# Patient Record
Sex: FEMALE
[De-identification: names, ages, dates, MRNs, and addresses within clinical notes are randomized per-mention and may not be internally consistent; named-entity substitution may affect disease eponyms.]

---

## 2018-04-09 ENCOUNTER — HOSPITAL ENCOUNTER (EMERGENCY)
Dept: HOSPITAL 31 - C.ER | Age: 73
Discharge: HOME | End: 2018-04-09
Payer: MEDICARE

## 2018-04-09 VITALS — OXYGEN SATURATION: 99 %

## 2018-04-09 VITALS — HEART RATE: 59 BPM | SYSTOLIC BLOOD PRESSURE: 117 MMHG | TEMPERATURE: 97.8 F | DIASTOLIC BLOOD PRESSURE: 76 MMHG

## 2018-04-09 VITALS — RESPIRATION RATE: 18 BRPM

## 2018-04-09 DIAGNOSIS — M25.512: Primary | ICD-10-CM

## 2018-04-09 NOTE — RAD
PROCEDURE:  Radiographs of the Left Shoulder



HISTORY:

Joint pain 







COMPARISON:

No prior.



FINDINGS:



BONES:

Bone alignment and mineralization are normal. No acute fracture.



JOINTS:

There is mild degenerative osteoarthrosis in the acromioclavicular 

joint with subarticular irregularity and mild reduced joint space.  

The glenohumeral joint is normal. 



SOFT TISSUES:

Normal.



OTHER FINDINGS:

None. 



IMPRESSION:

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



No acute fracture or dislocation.

## 2018-04-09 NOTE — C.PDOC
History Of Present Illness


72 year old female with PMHx of HTN and DM presents to the ED for evaluation of 

B/L shoulder pain, left more then right. Patient reports she has been feeling 

pain to her left shoulder since October but in the past months it has worsened. 

Patient reports she is unable to move her arm due to pain and that pain worsens 

with movement. Patient reports she has been taking Advil for pain relief. 

Patient's PMD is Dr. Espino, she reports she had an appointment today but 

because of her pain she decided to come to the ED. Patient denies headache, 

neck pain, neck stiffness, trauma, injury, fall, weakness, numbness. 


Time Seen by Provider: 18 11:15


Chief Complaint (Nursing): Upper Extremity Problem/Injury


History Per: Patient


History/Exam Limitations: no limitations


Onset/Duration Of Symptoms: Days


Current Symptoms Are (Timing): Still Present


Quality: "Pain"


Exacerbating Factor(s): Movement


Recent travel outside of the [place]: No


Additional History Per: Patient





Past Medical History


Reviewed: Historical Data, Nursing Documentation, Vital Signs


Vital Signs: 


 Last Vital Signs











Temp  97.8 F   18 12:22


 


Pulse  59 L  18 12:22


 


Resp  18   18 12:22


 


BP  117/76   18 12:22


 


Pulse Ox  98   18 12:22














- Medical History


PMH: Diabetes, HTN


Surgical History: No Surg Hx





- [facility] Procedures








CLOSURE SKIN & SUBCUTANEOUS NEC (05)








Family History: States: Unknown Family Hx





- Social History


Hx Alcohol Use: No


Hx Substance Use: No





- Immunization History


Hx Tetanus Toxoid Vaccination: No


Hx Influenza Vaccination: No


Hx Pneumococcal Vaccination: No





Review Of Systems


Constitutional: Negative for: Fever, Chills


Cardiovascular: Negative for: Chest Pain


Respiratory: Negative for: Shortness of Breath


Musculoskeletal: Positive for: Shoulder Pain, Arm Pain


Skin: Negative for: Rash


Neurological: Negative for: Weakness, Numbness, Headache, Dizziness





Physical Exam





- Physical Exam


Appears: Non-toxic, No Acute Distress


Skin: Normal Color, Warm, Dry


Head: Atraumatic, Normacephalic


Eye(s): bilateral: Normal Inspection


Neck: Normal ROM, No Midline Cervical Tenderness, Supple


Chest: Symmetrical


Cardiovascular: Rhythm Regular, No Murmur


Respiratory: Normal Breath Sounds, No Rales, No Rhonchi, No Wheezing


Extremity: No Normal ROM (Left shoulder unable to abduct up to shoulder height. 

Normal at elbow and wrist. Normal right shoulder, wrist, elbow), Tenderness (

left shoulder over joint), Capillary Refill (< 2 seconds), No Swelling


Pulses: Left Radial: Normal, Right Radial: Normal


Neurological/Psych: Oriented x3, Normal Motor, Normal Sensation


Gait: Steady





ED Course And Treatment


ECG: Interpreted By Me


ECG Interpretation: Normal


Interpretation Of ECG: nsr with sinus arrhythmia


Rate From EC


O2 Sat by Pulse Oximetry: 99 (ON RA)


Pulse Ox Interpretation: Normal





- Other Rad


  ** shoulder left


X-Ray: Read By Radiologist


Interpretation: IMPRESSION:  Mild degenerative osteoarthrosis in the 

acromioclavicular joint.  No acute fracture or dislocation.





Medical Decision Making


Medical Decision Making: 


Impression: B/L shoulder pain


Plan:


* EKG


* Tylenol 975 mg PO


* Left shoulder X-Ray





Patient was advised to follow up with her PMD DR. Espino and also follow up with 

an orthopedist for evaluation of her symptoms. 





Disposition


Counseled Patient/Family Regarding: Studies Performed, Diagnosis, Need For 

Followup, Rx Given





- Disposition


Referrals: 


Alonzo Burgess MD [Staff Provider] - 


Disposition: HOME/ ROUTINE


Disposition Time: 13:18


Condition: GOOD


Additional Instructions: 





Por favor, tome diclofenac o Advil, no juntos. Tambin puede lizzie Tylenol 

entre dosis de otros. Aplique compresas fras en el hombro gustabo varias 

veces al da sobre un devon de cocina o un trapo ligero. Intenta  el hombro 

gustabo suavemente. Brenden un seguimiento con mejia mdico de atencin primaria y 

con el Dr. Burgess, ortopedista.





Please take either diclofenac or Advil, not together. You may also take Tylenol 

in between doses of other. Apply cold compresses to left shoulder several times 

per day over a dish towel or light cloth. Try to move left shoulder gently. 

Follow up with your primary care doctor and with Dr Burgess, orthopedist. 


Prescriptions: 


Acetaminophen [Tylenol 325mg tab] 650 mg PO Q4 #50 tab


Forms:  [facility] Discharge [facility] [place], [facility] ([place])





- Clinical Impression


Clinical Impression: 


 Shoulder pain, left








- PA / NP / Resident Statement


MD/DO has reviewed & agrees with the documentation as recorded.





- Scribe Statement


The provider has reviewed the documentation as recorded by the Scribe


Ferny West





All medical record entries made by the Boydibe were at my direction and 

personally dictated by me. I have reviewed the chart and agree that the record 

accurately reflects my personal performance of the history, physical exam, 

medical decision making, and the department course for this patient. I have 

also personally directed, reviewed, and agree with the discharge instructions 

and disposition.

## 2018-04-10 NOTE — CARD
--------------- APPROVED REPORT --------------





EKG Measurement

Heart [id number]

ND 152P-2

[id number]

[id number]

[id number]



<Conclusion>

Normal sinus rhythm with sinus arrhythmia

Normal ECG